# Patient Record
Sex: MALE | Race: OTHER
[De-identification: names, ages, dates, MRNs, and addresses within clinical notes are randomized per-mention and may not be internally consistent; named-entity substitution may affect disease eponyms.]

---

## 2018-12-12 ENCOUNTER — HOSPITAL ENCOUNTER (EMERGENCY)
Dept: HOSPITAL 54 - ER | Age: 28
Discharge: HOME | End: 2018-12-12
Payer: MEDICAID

## 2018-12-12 VITALS — HEIGHT: 67 IN | BODY MASS INDEX: 20.09 KG/M2 | WEIGHT: 128.01 LBS

## 2018-12-12 VITALS — DIASTOLIC BLOOD PRESSURE: 53 MMHG | SYSTOLIC BLOOD PRESSURE: 112 MMHG

## 2018-12-12 DIAGNOSIS — K62.89: ICD-10-CM

## 2018-12-12 DIAGNOSIS — L30.9: Primary | ICD-10-CM

## 2018-12-12 PROCEDURE — 99282 EMERGENCY DEPT VISIT SF MDM: CPT

## 2018-12-12 PROCEDURE — Z7610: HCPCS

## 2018-12-12 PROCEDURE — A4606 OXYGEN PROBE USED W OXIMETER: HCPCS

## 2018-12-12 NOTE — NUR
PT BIB SELF C/O OF RASH ON THE BUTT, PT IS AOX4, NOT IN RESPIRATORY DISTRESS, 
V/S STABLE, KEPT RESTED AND COMFORTABLE, AWAITING ER MD FOR EVAL.